# Patient Record
Sex: FEMALE | Race: WHITE | Employment: FULL TIME | URBAN - METROPOLITAN AREA
[De-identification: names, ages, dates, MRNs, and addresses within clinical notes are randomized per-mention and may not be internally consistent; named-entity substitution may affect disease eponyms.]

---

## 2019-08-22 ENCOUNTER — LAB ENCOUNTER (OUTPATIENT)
Dept: LAB | Age: 39
End: 2019-08-22
Attending: OBSTETRICS & GYNECOLOGY
Payer: COMMERCIAL

## 2019-08-22 ENCOUNTER — OFFICE VISIT (OUTPATIENT)
Dept: OBGYN CLINIC | Facility: CLINIC | Age: 39
End: 2019-08-22
Payer: COMMERCIAL

## 2019-08-22 VITALS
HEART RATE: 78 BPM | BODY MASS INDEX: 19.93 KG/M2 | SYSTOLIC BLOOD PRESSURE: 118 MMHG | DIASTOLIC BLOOD PRESSURE: 70 MMHG | TEMPERATURE: 98 F | RESPIRATION RATE: 16 BRPM | WEIGHT: 124 LBS | HEIGHT: 66 IN

## 2019-08-22 DIAGNOSIS — Z34.91 PREGNANCY WITH UNCERTAIN DATE OF LAST MENSTRUAL PERIOD IN FIRST TRIMESTER, ANTEPARTUM: ICD-10-CM

## 2019-08-22 DIAGNOSIS — N96 HISTORY OF MULTIPLE MISCARRIAGES: ICD-10-CM

## 2019-08-22 DIAGNOSIS — O20.0 THREATENED ABORTION, ANTEPARTUM: Primary | ICD-10-CM

## 2019-08-22 DIAGNOSIS — O20.0 THREATENED ABORTION, ANTEPARTUM: ICD-10-CM

## 2019-08-22 LAB
ANTIBODY SCREEN: NEGATIVE
B-HCG SERPL-ACNC: 1804 MIU/ML
CONTROL LINE PRESENT WITH A CLEAR BACKGROUND (YES/NO): YES YES/NO
PREGNANCY TEST, URINE: POSITIVE
PROGEST SERPL-MCNC: 19.8 NG/ML
RH BLOOD TYPE: POSITIVE

## 2019-08-22 PROCEDURE — 99203 OFFICE O/P NEW LOW 30 MIN: CPT | Performed by: OBSTETRICS & GYNECOLOGY

## 2019-08-22 PROCEDURE — 86901 BLOOD TYPING SEROLOGIC RH(D): CPT | Performed by: OBSTETRICS & GYNECOLOGY

## 2019-08-22 PROCEDURE — 86850 RBC ANTIBODY SCREEN: CPT | Performed by: OBSTETRICS & GYNECOLOGY

## 2019-08-22 PROCEDURE — 81025 URINE PREGNANCY TEST: CPT | Performed by: OBSTETRICS & GYNECOLOGY

## 2019-08-22 PROCEDURE — 84702 CHORIONIC GONADOTROPIN TEST: CPT | Performed by: OBSTETRICS & GYNECOLOGY

## 2019-08-22 PROCEDURE — 86900 BLOOD TYPING SEROLOGIC ABO: CPT | Performed by: OBSTETRICS & GYNECOLOGY

## 2019-08-22 PROCEDURE — 84144 ASSAY OF PROGESTERONE: CPT | Performed by: OBSTETRICS & GYNECOLOGY

## 2019-08-22 RX ORDER — IMIPRAMINE HYDROCHLORIDE 10 MG/1
1 TABLET, FILM COATED ORAL DAILY
Refills: 6 | COMMUNITY
Start: 2019-07-25

## 2019-08-22 NOTE — H&P
[address] [facility]  Obstetrics and Gynecology   History & Physical  New patient to this practice (pt known to me from [address])     Chief complaint: Patient presents with:   Other: patient c/o spotting and cramping, took pregnacy test and it wa 05/14/17 8w0d    SAB   FD      Birth Comments: Fetus without heartbeat on US. D&C done.     3 Term 07/11/14   6 lb 7 oz F NORMAL SPONT None  GIANFRANCO   2 Term 10/01/02   8 lb 14 oz F NORMAL SPONT EPI     1 Term 01/09/98   7 lb 6 oz F NORMAL SPONT EPI         Gyn Used    Substance and Sexual Activity      Alcohol use: Not Currently      Drug use: Never      Sexual activity: Not Currently    Lifestyle      Physical activity:        Days per week: Not on file        Minutes per session: Not on file      Stress: Not o appears well-developed and well-nourished. No distress. HENT:   Head: Normocephalic and atraumatic. Eyes: Pupils are equal, round, and reactive to light. EOM are normal.   Neck: No thyromegaly present. Cardiovascular: Normal rate and regular rhythm. period in first trimester, antepartum    History of multiple miscarriages         Plan:     +UCG in office today  Will check HCG quant, progesterone, ABO/Rh, Ab screen. Plan serial HCG measurements. Order given  Check pelvic US. Order given.    Tessie Ortega

## 2019-08-27 NOTE — PROGRESS NOTES
Patient aware of results and recommendations to have repeat HCG done on Saturday. Patient verbalizes understanding.

## 2019-08-30 LAB — HCG SERUM: 2504

## 2019-09-04 ENCOUNTER — TELEPHONE (OUTPATIENT)
Dept: OBGYN CLINIC | Facility: CLINIC | Age: 39
End: 2019-09-04

## 2019-09-04 DIAGNOSIS — O03.9 SAB (SPONTANEOUS ABORTION): Primary | ICD-10-CM

## 2019-09-04 NOTE — TELEPHONE ENCOUNTER
Pt just called to get her test results ordered by dr JEROME and done at [facility]. Please advise and call pt.  Thanks

## 2019-09-05 NOTE — TELEPHONE ENCOUNTER
Patient aware of HCG results from 8/24/19 and states that day after the blood draw she had heavy bleeding with clots for couple days. Patient thinks she had miscarriage. Per Dr. Willie Garcia order patient instructed to have another HCG done. Order placed.  P

## 2019-09-06 ENCOUNTER — TELEPHONE (OUTPATIENT)
Dept: OBGYN CLINIC | Facility: CLINIC | Age: 39
End: 2019-09-06

## 2019-09-10 ENCOUNTER — LAB ENCOUNTER (OUTPATIENT)
Dept: LAB | Age: 39
End: 2019-09-10
Attending: OBSTETRICS & GYNECOLOGY
Payer: COMMERCIAL

## 2019-09-10 DIAGNOSIS — O03.9 SAB (SPONTANEOUS ABORTION): ICD-10-CM

## 2019-09-10 LAB — B-HCG SERPL-ACNC: 3 MIU/ML

## 2019-09-10 PROCEDURE — 84702 CHORIONIC GONADOTROPIN TEST: CPT | Performed by: OBSTETRICS & GYNECOLOGY

## 2019-09-11 DIAGNOSIS — Z30.09 GENERAL COUNSELING AND ADVICE FOR CONTRACEPTIVE MANAGEMENT: Primary | ICD-10-CM

## 2019-09-11 RX ORDER — ETONOGESTREL AND ETHINYL ESTRADIOL 11.7; 2.7 MG/1; MG/1
INSERT, EXTENDED RELEASE VAGINAL
Qty: 4 EACH | Refills: 11 | Status: SHIPPED | OUTPATIENT
Start: 2019-09-11

## 2019-09-11 NOTE — PROGRESS NOTES
Patient aware of results and recommendations. Patient verbalizes understanding. Patient would like to try Nuva ring for birth control and would like a script sent to doctor for signature

## 2020-08-03 ENCOUNTER — TELEPHONE (OUTPATIENT)
Dept: SCHEDULING | Age: 40
End: 2020-08-03

## 2020-08-04 ENCOUNTER — WALK IN (OUTPATIENT)
Dept: URGENT CARE | Age: 40
End: 2020-08-04
Attending: EMERGENCY MEDICINE

## 2020-08-04 ENCOUNTER — HOSPITAL ENCOUNTER (OUTPATIENT)
Dept: GENERAL RADIOLOGY | Age: 40
Discharge: HOME | End: 2020-08-04
Attending: EMERGENCY MEDICINE

## 2020-08-04 VITALS
SYSTOLIC BLOOD PRESSURE: 135 MMHG | OXYGEN SATURATION: 99 % | HEART RATE: 84 BPM | WEIGHT: 132 LBS | DIASTOLIC BLOOD PRESSURE: 80 MMHG | RESPIRATION RATE: 16 BRPM | TEMPERATURE: 98.8 F

## 2020-08-04 DIAGNOSIS — S99.922A INJURY OF LEFT FOOT, INITIAL ENCOUNTER: ICD-10-CM

## 2020-08-04 DIAGNOSIS — S93.602A FOOT SPRAIN, LEFT, INITIAL ENCOUNTER: Primary | ICD-10-CM

## 2020-08-04 PROCEDURE — 99203 OFFICE O/P NEW LOW 30 MIN: CPT

## 2020-08-04 PROCEDURE — 73630 X-RAY EXAM OF FOOT: CPT

## 2020-08-04 RX ORDER — ESZOPICLONE 2 MG/1
2 TABLET, FILM COATED ORAL
COMMUNITY
Start: 2020-06-26

## 2020-08-07 ENCOUNTER — OFFICE VISIT (OUTPATIENT)
Dept: PODIATRY | Age: 40
End: 2020-08-07

## 2020-08-07 ENCOUNTER — IMAGING SERVICES (OUTPATIENT)
Dept: GENERAL RADIOLOGY | Age: 40
End: 2020-08-07
Attending: PODIATRIST

## 2020-08-07 DIAGNOSIS — M79.672 LEFT FOOT PAIN: Primary | ICD-10-CM

## 2020-08-07 DIAGNOSIS — S93.492A SPRAIN OF ANTERIOR TALOFIBULAR LIGAMENT OF LEFT ANKLE, INITIAL ENCOUNTER: ICD-10-CM

## 2020-08-07 DIAGNOSIS — S93.412A SPRAIN OF CALCANEOFIBULAR LIGAMENT OF LEFT ANKLE, INITIAL ENCOUNTER: ICD-10-CM

## 2020-08-07 DIAGNOSIS — M79.672 LEFT FOOT PAIN: ICD-10-CM

## 2020-08-07 PROCEDURE — 99203 OFFICE O/P NEW LOW 30 MIN: CPT | Performed by: PODIATRIST

## 2020-08-07 PROCEDURE — 73610 X-RAY EXAM OF ANKLE: CPT | Performed by: RADIOLOGY

## 2020-08-14 PROBLEM — S93.412A SPRAIN OF CALCANEOFIBULAR LIGAMENT OF LEFT ANKLE: Status: ACTIVE | Noted: 2020-08-14

## 2020-08-14 PROBLEM — M79.672 LEFT FOOT PAIN: Status: ACTIVE | Noted: 2020-08-14

## 2020-08-14 PROBLEM — S93.492A SPRAIN OF ANTERIOR TALOFIBULAR LIGAMENT OF LEFT ANKLE: Status: ACTIVE | Noted: 2020-08-14

## 2020-08-26 ENCOUNTER — IMAGING SERVICES (OUTPATIENT)
Dept: GENERAL RADIOLOGY | Age: 40
End: 2020-08-26
Attending: PODIATRIST

## 2020-08-26 ENCOUNTER — MED INFO FORMS (OUTPATIENT)
Dept: HEALTH INFORMATION MANAGEMENT | Facility: OTHER | Age: 40
End: 2020-08-26

## 2020-08-26 ENCOUNTER — OFFICE VISIT (OUTPATIENT)
Dept: PODIATRY | Age: 40
End: 2020-08-26

## 2020-08-26 ENCOUNTER — MED INFO FORMS (OUTPATIENT)
Dept: PODIATRY | Age: 40
End: 2020-08-26

## 2020-08-26 DIAGNOSIS — S93.492D SPRAIN OF ANTERIOR TALOFIBULAR LIGAMENT OF LEFT ANKLE, SUBSEQUENT ENCOUNTER: ICD-10-CM

## 2020-08-26 DIAGNOSIS — S93.412D SPRAIN OF CALCANEOFIBULAR LIGAMENT OF LEFT ANKLE, SUBSEQUENT ENCOUNTER: Primary | ICD-10-CM

## 2020-08-26 DIAGNOSIS — S93.412D SPRAIN OF CALCANEOFIBULAR LIGAMENT OF LEFT ANKLE, SUBSEQUENT ENCOUNTER: ICD-10-CM

## 2020-08-26 PROCEDURE — 73610 X-RAY EXAM OF ANKLE: CPT | Performed by: PODIATRIST

## 2020-08-26 PROCEDURE — 99213 OFFICE O/P EST LOW 20 MIN: CPT | Performed by: PODIATRIST

## 2020-09-02 ENCOUNTER — TELEPHONE (OUTPATIENT)
Dept: PODIATRY | Age: 40
End: 2020-09-02

## 2020-09-02 DIAGNOSIS — S93.492A SPRAIN OF ANTERIOR TALOFIBULAR LIGAMENT OF LEFT ANKLE, INITIAL ENCOUNTER: Primary | ICD-10-CM

## 2020-09-02 DIAGNOSIS — S93.412A SPRAIN OF CALCANEOFIBULAR LIGAMENT OF LEFT ANKLE, INITIAL ENCOUNTER: ICD-10-CM

## 2020-09-09 ENCOUNTER — HOSPITAL ENCOUNTER (OUTPATIENT)
Dept: PHYSICAL MEDICINE AND REHAB | Age: 40
Discharge: STILL ADMITTED OR ADMINISTRATIVE | End: 2020-09-09
Attending: PODIATRIST

## 2020-09-09 DIAGNOSIS — S93.412A SPRAIN OF CALCANEOFIBULAR LIGAMENT OF LEFT ANKLE, INITIAL ENCOUNTER: ICD-10-CM

## 2020-09-09 DIAGNOSIS — S93.492A SPRAIN OF ANTERIOR TALOFIBULAR LIGAMENT OF LEFT ANKLE, INITIAL ENCOUNTER: ICD-10-CM

## 2020-09-09 PROCEDURE — 97110 THERAPEUTIC EXERCISES: CPT | Performed by: PHYSICAL THERAPIST

## 2020-09-09 PROCEDURE — 97161 PT EVAL LOW COMPLEX 20 MIN: CPT | Performed by: PHYSICAL THERAPIST

## 2020-09-10 ENCOUNTER — TELEPHONE (OUTPATIENT)
Dept: PODIATRY | Age: 40
End: 2020-09-10

## 2020-09-10 ENCOUNTER — TELEPHONE (OUTPATIENT)
Dept: PHYSICAL MEDICINE AND REHAB | Age: 40
End: 2020-09-10

## 2020-09-10 ASSESSMENT — MOVEMENT AND STRENGTH ASSESSMENTS
STANDING FOR 1 HOUR: EXTREME DIFFICULTY OR UNABLE TO PERFORM ACTIVITY
GOING UP OR DOWN 10 STAIRS (ABOUT 1 FLIGHT OF STAIRS): EXTREME DIFFICULTY OR UNABLE TO PERFORM ACTIVITY
PERFORMING LIGHT ACTIVITES AROUND YOUR HOME: MODERATE DIFFICULTY
LIFTING AN OBJECT, LIKE A BAG OF GROCERIES, FROM THE FLOOR: NO DIFFICULTY
HOPPING: MODERATE DIFFICULTY
ANY OF YOUR USUAL WORK, HOUSEWORK OR SCHOOL ACTIVITIES: EXTREME DIFFICULTY OR UNABLE TO PERFORM ACTIVITY
WALKING A MILE: EXTREME DIFFICULTY OR UNABLE TO PERFORM ACTIVITY
PUTTING ON YOUR SHOES OR SOCKS: NO DIFFICULTY
WALKING 2 BLOCKS: EXTREME DIFFICULTY OR UNABLE TO PERFORM ACTIVITY
RUNNING ON EVEN GROUND: EXTREME DIFFICULTY OR UNABLE TO PERFORM ACTIVITY
SQUATTING: MODERATE DIFFICULTY
MAKING SHARP TURNS WHILE RUNNING FAST: EXTREME DIFFICULTY OR UNABLE TO PERFORM ACTIVITY
WALKING BETWEEN ROOMS: EXTREME DIFFICULTY OR UNABLE TO PERFORM ACTIVITY
TOTAL SCORE: 26.25
GETTING INTO OR OUT OF A CAR: MODERATE DIFFICULTY
SITTING FOR 1 HOUR: EXTREME DIFFICULTY OR UNABLE TO PERFORM ACTIVITY
PERFORMING HEAVY ACTIVITIES AROUND YOUR HOME: EXTREME DIFFICULTY OR UNABLE TO PERFORM ACTIVITY
ROLLING OVER IN BED: A LITTLE BIT OF DIFFICULTY
YOUR USUAL HOBBIES, RECREATIONAL OR SPORTING ACTIVIITIES: EXTREME DIFFICULTY OR UNABLE TO PERFORM ACTIVITY
GETTING INTO OR OUT OF THE BATH: MODERATE DIFFICULTY
RUNNING ON UNEVEN GROUND: EXTREME DIFFICULTY OR UNABLE TO PERFORM ACTIVITY

## 2020-09-10 ASSESSMENT — ENCOUNTER SYMPTOMS
PAIN LOCATION: L LATERAL ANKLE
ALLEVIATING FACTORS: REST
PAIN SCALE AT HIGHEST: 6
QUALITY: ACHE
PAIN SCALE AT LOWEST: 0
PAIN SEVERITY NOW: 2
ALLEVIATING FACTOR: ELEVATION

## 2020-09-14 ENCOUNTER — HOSPITAL ENCOUNTER (OUTPATIENT)
Dept: PHYSICAL MEDICINE AND REHAB | Age: 40
Discharge: STILL ADMITTED OR ADMINISTRATIVE | End: 2020-09-14
Attending: PODIATRIST

## 2020-09-14 PROCEDURE — 97110 THERAPEUTIC EXERCISES: CPT | Performed by: PHYSICAL THERAPY ASSISTANT

## 2020-09-14 PROCEDURE — 97140 MANUAL THERAPY 1/> REGIONS: CPT | Performed by: PHYSICAL THERAPY ASSISTANT

## 2020-09-16 ENCOUNTER — HOSPITAL ENCOUNTER (OUTPATIENT)
Dept: PHYSICAL MEDICINE AND REHAB | Age: 40
Discharge: STILL ADMITTED OR ADMINISTRATIVE | End: 2020-09-16

## 2020-09-16 PROCEDURE — 97530 THERAPEUTIC ACTIVITIES: CPT | Performed by: PHYSICAL THERAPY ASSISTANT

## 2020-09-16 PROCEDURE — 97110 THERAPEUTIC EXERCISES: CPT | Performed by: PHYSICAL THERAPY ASSISTANT

## 2020-09-23 ENCOUNTER — HOSPITAL ENCOUNTER (OUTPATIENT)
Dept: PHYSICAL MEDICINE AND REHAB | Age: 40
Discharge: STILL ADMITTED OR ADMINISTRATIVE | End: 2020-09-23

## 2020-09-23 ENCOUNTER — OFFICE VISIT (OUTPATIENT)
Dept: PODIATRY | Age: 40
End: 2020-09-23

## 2020-09-23 DIAGNOSIS — S93.412A SPRAIN OF CALCANEOFIBULAR LIGAMENT OF LEFT ANKLE, INITIAL ENCOUNTER: ICD-10-CM

## 2020-09-23 DIAGNOSIS — S93.492A SPRAIN OF ANTERIOR TALOFIBULAR LIGAMENT OF LEFT ANKLE, INITIAL ENCOUNTER: Primary | ICD-10-CM

## 2020-09-23 DIAGNOSIS — M79.672 LEFT FOOT PAIN: ICD-10-CM

## 2020-09-23 PROCEDURE — 97530 THERAPEUTIC ACTIVITIES: CPT | Performed by: PHYSICAL THERAPY ASSISTANT

## 2020-09-23 PROCEDURE — 99213 OFFICE O/P EST LOW 20 MIN: CPT | Performed by: PODIATRIST

## 2020-09-23 PROCEDURE — 97110 THERAPEUTIC EXERCISES: CPT | Performed by: PHYSICAL THERAPY ASSISTANT

## 2020-09-23 PROCEDURE — 97542 WHEELCHAIR MNGMENT TRAINING: CPT | Performed by: PHYSICAL THERAPY ASSISTANT

## 2020-09-28 ENCOUNTER — APPOINTMENT (OUTPATIENT)
Dept: PHYSICAL MEDICINE AND REHAB | Age: 40
End: 2020-09-28

## 2020-09-30 ENCOUNTER — APPOINTMENT (OUTPATIENT)
Dept: PHYSICAL MEDICINE AND REHAB | Age: 40
End: 2020-09-30

## 2020-10-02 ENCOUNTER — TELEPHONE (OUTPATIENT)
Dept: PODIATRY | Age: 40
End: 2020-10-02

## 2020-10-05 ENCOUNTER — APPOINTMENT (OUTPATIENT)
Dept: PHYSICAL MEDICINE AND REHAB | Age: 40
End: 2020-10-05
Attending: PODIATRIST

## 2020-10-07 ENCOUNTER — APPOINTMENT (OUTPATIENT)
Dept: PHYSICAL MEDICINE AND REHAB | Age: 40
End: 2020-10-07

## 2020-10-12 ENCOUNTER — HOSPITAL ENCOUNTER (OUTPATIENT)
Dept: PHYSICAL MEDICINE AND REHAB | Age: 40
Discharge: STILL ADMITTED OR ADMINISTRATIVE | End: 2020-10-13
Attending: PODIATRIST

## 2020-10-12 PROCEDURE — 97110 THERAPEUTIC EXERCISES: CPT | Performed by: PHYSICAL THERAPIST

## 2020-10-14 ENCOUNTER — APPOINTMENT (OUTPATIENT)
Dept: PHYSICAL MEDICINE AND REHAB | Age: 40
End: 2020-10-14

## 2020-10-19 ENCOUNTER — HOSPITAL ENCOUNTER (OUTPATIENT)
Dept: PHYSICAL MEDICINE AND REHAB | Age: 40
Discharge: STILL ADMITTED OR ADMINISTRATIVE | End: 2020-10-19

## 2020-10-19 PROCEDURE — 97110 THERAPEUTIC EXERCISES: CPT | Performed by: PHYSICAL THERAPY ASSISTANT

## 2020-10-21 ENCOUNTER — HOSPITAL ENCOUNTER (OUTPATIENT)
Dept: PHYSICAL MEDICINE AND REHAB | Age: 40
Discharge: STILL ADMITTED OR ADMINISTRATIVE | End: 2020-10-21
Attending: PODIATRIST

## 2020-10-21 PROCEDURE — 97110 THERAPEUTIC EXERCISES: CPT | Performed by: PHYSICAL THERAPY ASSISTANT

## 2020-11-04 ENCOUNTER — APPOINTMENT (OUTPATIENT)
Dept: PHYSICAL MEDICINE AND REHAB | Age: 40
End: 2020-11-04
Attending: PODIATRIST

## 2020-11-10 ENCOUNTER — APPOINTMENT (OUTPATIENT)
Dept: PHYSICAL MEDICINE AND REHAB | Age: 40
End: 2020-11-10

## 2020-11-17 ENCOUNTER — HOSPITAL ENCOUNTER (OUTPATIENT)
Dept: PHYSICAL MEDICINE AND REHAB | Age: 40
Discharge: STILL ADMITTED OR ADMINISTRATIVE | End: 2020-11-17
Attending: PODIATRIST

## 2020-11-17 PROCEDURE — 97110 THERAPEUTIC EXERCISES: CPT | Performed by: PHYSICAL THERAPIST

## 2020-11-17 ASSESSMENT — ENCOUNTER SYMPTOMS: PAIN SEVERITY NOW: 1

## 2020-11-24 ENCOUNTER — APPOINTMENT (OUTPATIENT)
Dept: PHYSICAL MEDICINE AND REHAB | Age: 40
End: 2020-11-24
Attending: PODIATRIST

## 2020-12-01 ENCOUNTER — HOSPITAL ENCOUNTER (OUTPATIENT)
Dept: PHYSICAL MEDICINE AND REHAB | Age: 40
Discharge: STILL ADMITTED OR ADMINISTRATIVE | End: 2020-12-01
Attending: PODIATRIST

## 2020-12-01 PROCEDURE — 97140 MANUAL THERAPY 1/> REGIONS: CPT | Performed by: PHYSICAL THERAPIST

## 2020-12-01 PROCEDURE — 97110 THERAPEUTIC EXERCISES: CPT | Performed by: PHYSICAL THERAPIST

## 2020-12-01 ASSESSMENT — ENCOUNTER SYMPTOMS
ALLEVIATING FACTORS: REST
SUBJECTIVE PAIN PROGRESSION: IMPROVED
QUALITY: ACHE
QUALITY: SORE
PAIN SEVERITY NOW: 3
PAIN FREQUENCY: INTERMITTENT

## 2021-02-24 ENCOUNTER — EMPLOYEE HEALTH (OUTPATIENT)
Dept: OTHER | Facility: HOSPITAL | Age: 41
End: 2021-02-24
Attending: PREVENTIVE MEDICINE

## 2021-02-24 DIAGNOSIS — Z11.1 SCREENING-PULMONARY TB: Primary | ICD-10-CM

## 2021-02-24 PROCEDURE — 86480 TB TEST CELL IMMUN MEASURE: CPT

## 2021-02-25 LAB
M TB IFN-G CD4+ T-CELLS BLD-ACNC: 0.08 IU/ML
M TB TUBERC IFN-G BLD QL: NEGATIVE
M TB TUBERC IGNF/MITOGEN IGNF CONTROL: 7.53 IU/ML
QUANTIFERON TB1 MINUS NIL: 0 IU/ML
QUANTIFERON TB2 MINUS NIL: -0.01 IU/ML

## 2023-03-20 ENCOUNTER — LAB REQUISITION (OUTPATIENT)
Dept: OCCUPATIONAL MEDICINE | Age: 43
End: 2023-03-20

## 2023-03-20 DIAGNOSIS — Z00.00 ENCOUNTER FOR GENERAL ADULT MEDICAL EXAMINATION WITHOUT ABNORMAL FINDINGS: ICD-10-CM

## 2023-03-20 PROCEDURE — PSEU9049 QUANTIFERON TB PLUS: Performed by: CLINICAL MEDICAL LABORATORY

## 2023-03-20 PROCEDURE — 86480 TB TEST CELL IMMUN MEASURE: CPT | Performed by: CLINICAL MEDICAL LABORATORY

## 2023-03-22 LAB
GAMMA INTERFERON BACKGROUND BLD IA-ACNC: 0.05 IU/ML
M TB IFN-G BLD-IMP: NEGATIVE
M TB IFN-G CD4+ BCKGRND COR BLD-ACNC: 0 IU/ML
M TB IFN-G CD4+CD8+ BCKGRND COR BLD-ACNC: 0 IU/ML
MITOGEN IGNF BCKGRD COR BLD-ACNC: >10 IU/ML